# Patient Record
Sex: FEMALE | Race: WHITE | NOT HISPANIC OR LATINO | Employment: OTHER
[De-identification: names, ages, dates, MRNs, and addresses within clinical notes are randomized per-mention and may not be internally consistent; named-entity substitution may affect disease eponyms.]

---

## 2017-10-01 ENCOUNTER — HEALTH MAINTENANCE LETTER (OUTPATIENT)
Age: 52
End: 2017-10-01

## 2020-02-23 ENCOUNTER — HEALTH MAINTENANCE LETTER (OUTPATIENT)
Age: 55
End: 2020-02-23

## 2020-12-13 ENCOUNTER — HEALTH MAINTENANCE LETTER (OUTPATIENT)
Age: 55
End: 2020-12-13

## 2021-04-17 ENCOUNTER — HEALTH MAINTENANCE LETTER (OUTPATIENT)
Age: 56
End: 2021-04-17

## 2021-09-26 ENCOUNTER — HEALTH MAINTENANCE LETTER (OUTPATIENT)
Age: 56
End: 2021-09-26

## 2022-01-01 ENCOUNTER — TRANSFERRED RECORDS (OUTPATIENT)
Dept: MULTI SPECIALTY CLINIC | Facility: CLINIC | Age: 57
End: 2022-01-01

## 2022-01-01 LAB — PAP SMEAR - HIM PATIENT REPORTED: NEGATIVE

## 2022-03-13 ENCOUNTER — HEALTH MAINTENANCE LETTER (OUTPATIENT)
Age: 57
End: 2022-03-13

## 2022-05-08 ENCOUNTER — HEALTH MAINTENANCE LETTER (OUTPATIENT)
Age: 57
End: 2022-05-08

## 2023-01-08 ENCOUNTER — HEALTH MAINTENANCE LETTER (OUTPATIENT)
Age: 58
End: 2023-01-08

## 2023-02-07 ENCOUNTER — OFFICE VISIT (OUTPATIENT)
Dept: FAMILY MEDICINE | Facility: CLINIC | Age: 58
End: 2023-02-07
Payer: COMMERCIAL

## 2023-02-07 VITALS
SYSTOLIC BLOOD PRESSURE: 120 MMHG | HEIGHT: 64 IN | TEMPERATURE: 97.7 F | RESPIRATION RATE: 14 BRPM | DIASTOLIC BLOOD PRESSURE: 82 MMHG | BODY MASS INDEX: 35.68 KG/M2 | WEIGHT: 209 LBS | OXYGEN SATURATION: 98 % | HEART RATE: 67 BPM

## 2023-02-07 DIAGNOSIS — Z76.0 PRESCRIPTION REFILL: ICD-10-CM

## 2023-02-07 DIAGNOSIS — Z12.11 COLON CANCER SCREENING: Primary | ICD-10-CM

## 2023-02-07 DIAGNOSIS — Z12.31 ENCOUNTER FOR SCREENING MAMMOGRAM FOR BREAST CANCER: ICD-10-CM

## 2023-02-07 PROCEDURE — 99203 OFFICE O/P NEW LOW 30 MIN: CPT | Performed by: PHYSICIAN ASSISTANT

## 2023-02-07 RX ORDER — LEVOTHYROXINE SODIUM 100 UG/1
100 TABLET ORAL
COMMUNITY
Start: 2022-09-21 | End: 2023-12-14

## 2023-02-07 RX ORDER — LEVOTHYROXINE SODIUM 100 UG/1
100 TABLET ORAL DAILY
Qty: 90 TABLET | Refills: 0 | Status: CANCELLED | OUTPATIENT
Start: 2023-02-07

## 2023-02-07 ASSESSMENT — PAIN SCALES - GENERAL: PAINLEVEL: MILD PAIN (3)

## 2023-02-07 NOTE — PROGRESS NOTES
Assessment & Plan     (Z12.11) Colon cancer screening  (primary encounter diagnosis)  Comment: Referral placed for colonoscopy screening.  Patient was advised to have this done every 5 years.  Plan: Colonoscopy Screening  Referral           (Z76.0) Prescription refill  Comment: Patient initially presented for prescription refill, however, in chart review while in the exam room I found the patient to have refills were written on 9/21/2022.  Patient called pharmacy and did in fact have refills of levothyroxine 100 mcg at that time.  Plan: Continue with medications as prescribed.  Follow-up in September as needed for annual exam.    (Z12.31) Encounter for screening mammogram for breast cancer  Comment: Discussed screening mammogram with patient.  She will schedule  Plan: MA SCREENING DIGITAL BILAT - Future  (s+30)                Return in about 8 months (around 10/7/2023) for Follow up, Routine preventive.    Eron Miles PA-C  [facility]    Matthew Doan is a 57 year old, presenting for the following health issues:  Medication Request      History of Present Illness       Reason for visit:  Rx refill    She eats 4 or more servings of fruits and vegetables daily.She consumes 1 sweetened beverage(s) daily.She exercises with enough effort to increase her heart rate 9 or less minutes per day.  She exercises with enough effort to increase her heart rate 3 or less days per week. She is missing 1 dose(s) of medications per week.     Patient has been taking levothyroxine 100 mg. No issues with the medication.   TSH 9/21/23 2.15 through [facility]   Years at the same dose.   No constipation, diarrhea, or hair loss.     Patient is looking to establish at this clinic.  Patient thought she was due for refills of levothyroxine, however, patient called pharmacy while in the exam room and found that she has refills of medications at [facility] pharmacy.    Patient does express desire to update her  "colonoscopy.  Last colonoscopy 2013 was recommended to have repeat in 5 years.  Patient has not completed this.    Further, the patient would like to update mammogram.  She has not had any concerns.      Review of Systems   Constitutional, HEENT, cardiovascular, pulmonary, gi and gu systems are negative, except as otherwise noted.      Objective    BP (!) 156/92   Pulse 67   Temp 97.7  F (36.5  C) (Tympanic)   Resp 14   Ht 1.613 m (5' 3.5\")   Wt 94.8 kg (209 lb)   SpO2 98%   BMI 36.44 kg/m    Body mass index is 36.44 kg/m .  Physical Exam   GENERAL: healthy, alert and no distress  RESP: lungs clear to auscultation - no rales, rhonchi or wheezes  CV: regular rate and rhythm, normal S1 S2, no S3 or S4, no murmur, click or rub, no peripheral edema and peripheral pulses strong  MS: no gross musculoskeletal defects noted, no edema  PSYCH: mentation appears normal, affect normal/bright          "

## 2023-04-17 ENCOUNTER — TELEPHONE (OUTPATIENT)
Dept: FAMILY MEDICINE | Facility: CLINIC | Age: 58
End: 2023-04-17
Payer: COMMERCIAL

## 2023-04-17 NOTE — TELEPHONE ENCOUNTER
Patient Quality Outreach    Patient is due for the following:   Colon Cancer Screening  Cervical Cancer Screening - PAP Needed      Topic Date Due     Zoster (Shingles) Vaccine (1 of 2) Never done     Hepatitis B Vaccine (2 of 3 - 19+ 3-dose series) 04/28/2017     COVID-19 Vaccine (3 - Booster for Mariano series) 03/17/2022       Next Steps:   Schedule a lab only visit for Colon Cancer Screening and PAP SMEAR.    Type of outreach:    Sent [employer] message.      Questions for provider review:         Blas Goddard MA

## 2023-07-27 ENCOUNTER — ANCILLARY ORDERS (OUTPATIENT)
Dept: RADIOLOGY | Facility: CLINIC | Age: 58
End: 2023-07-27

## 2023-07-27 ENCOUNTER — ANCILLARY ORDERS (OUTPATIENT)
Dept: FAMILY MEDICINE | Facility: CLINIC | Age: 58
End: 2023-07-27

## 2023-07-27 ENCOUNTER — ANCILLARY PROCEDURE (OUTPATIENT)
Dept: MAMMOGRAPHY | Facility: CLINIC | Age: 58
End: 2023-07-27
Attending: PHYSICIAN ASSISTANT
Payer: COMMERCIAL

## 2023-07-27 DIAGNOSIS — Z12.31 ENCOUNTER FOR SCREENING MAMMOGRAM FOR BREAST CANCER: ICD-10-CM

## 2023-07-27 DIAGNOSIS — Z76.0 PRESCRIPTION REFILL: ICD-10-CM

## 2023-07-27 DIAGNOSIS — Z12.11 COLON CANCER SCREENING: Primary | ICD-10-CM

## 2023-07-27 PROCEDURE — 77067 SCR MAMMO BI INCL CAD: CPT | Mod: TC | Performed by: RADIOLOGY

## 2023-07-27 PROCEDURE — 77063 BREAST TOMOSYNTHESIS BI: CPT | Mod: TC | Performed by: RADIOLOGY

## 2023-09-11 ENCOUNTER — TELEPHONE (OUTPATIENT)
Dept: FAMILY MEDICINE | Facility: CLINIC | Age: 58
End: 2023-09-11
Payer: COMMERCIAL

## 2023-09-11 NOTE — TELEPHONE ENCOUNTER
Patient Quality Outreach    Patient is due for the following:   Colon Cancer Screening  Physical Preventive Adult Physical      Topic Date Due    Zoster (Shingles) Vaccine (1 of 2) Never done    Hepatitis B Vaccine (2 of 3 - 19+ 3-dose series) 04/28/2017    COVID-19 Vaccine (3 - Booster for Mariano series) 03/17/2022    Flu Vaccine (1) 09/01/2023       Next Steps:   Schedule a Adult Preventative    Type of outreach:    Sent [employer] message.      Questions for provider review:               Blas Goddard MA

## 2023-09-28 NOTE — PROGRESS NOTES
SUBJECTIVE:   CC: Olimpia Flor is an 58 year old who presents for preventive health visit.       10/5/2023     1:16 PM   Additional Questions   Roomed by Tatyana Joe MA   Accompanied by Self     Routine general medical examination at a health care facility  Updated   - Lipid panel reflex to direct LDL Fasting; Future    Hypothyroidism, unspecified type  Stable  - TSH WITH FREE T4 REFLEX; Future  - TSH WITH FREE T4 REFLEX  Has been normal long standing, doing well. No fatigue, GI upset, mood changes, etc.   Has refill of synthroid ok to reach out when needed     Pinguecula of both eyes  Benign in nature. Artificial tears if bothersome although not symptomatic right now. If changing recheck    Episodic headache  Headaches, chronic, post menopausal. More active lately. Crying episodes. Intact uterus. Does not want to do HRT, which I agree with at this time. Responded well to excedrin, ok to use when headache comes on. Discussed further options if required down the road.        Colon cancer screening is due per pt, appt scot.    PAP was completed 01/01/2022 @ [facility] women's health result were normal. Will abstract into charts.            Healthy Habits:     Getting at least 3 servings of Calcium per day:  Yes    Bi-annual eye exam:  Yes    Dental care twice a year:  Yes    Sleep apnea or symptoms of sleep apnea:  None    Diet:  Regular (no restrictions)    Frequency of exercise:  2-3 days/week    Duration of exercise:  30-45 minutes    Taking medications regularly:  Yes    Medication side effects:  None    Additional concerns today:  Yes            Have you ever done Advance Care Planning? (For example, a Health Directive, POLST, or a discussion with a medical provider or your loved ones about your wishes): No, advance care planning information given to patient to review.  Patient declined advance care planning discussion at this time.    Social History     Tobacco Use    Smoking status: Never    Smokeless tobacco:  Never   Substance Use Topics    Alcohol use: Not on file             10/5/2023     1:18 PM   Alcohol Use   Prescreen: >3 drinks/day or >7 drinks/week? Not Applicable          No data to display              Reviewed orders with patient.  Reviewed health maintenance and updated orders accordingly - Yes  Lab work is in process  Labs reviewed in EPIC    Breast Cancer Screening:  Any new diagnosis of family breast, ovarian, or bowel cancer? No    FHS-7:       7/27/2023    10:22 AM   Breast CA Risk Assessment (FHS-7)   Did any of your first-degree relatives have breast or ovarian cancer? No   Did any of your relatives have bilateral breast cancer? No   Did any man in your family have breast cancer? No   Did any woman in your family have breast and ovarian cancer? No   Did any woman in your family have breast cancer before age 50 y? No   Do you have 2 or more relatives with breast and/or ovarian cancer? No   Do you have 2 or more relatives with breast and/or bowel cancer? No       Mammogram Screening: Recommended mammography every 1-2 years with patient discussion and risk factor consideration  Pertinent mammograms are reviewed under the imaging tab.    History of abnormal Pap smear: Completed, abstract into charts. No abnormalities      Reviewed and updated as needed this visit by clinical staff   Tobacco  Allergies  Meds  Problems  Med Hx  Surg Hx  Fam Hx          Reviewed and updated as needed this visit by Provider   Tobacco  Allergies  Meds  Problems  Med Hx  Surg Hx  Fam Hx         Past Medical History:   Diagnosis Date    Hypothyroidism       Past Surgical History:   Procedure Laterality Date    TOTAL KNEE ARTHROPLASTY Bilateral      OB History   No obstetric history on file.       Review of Systems   Constitutional:  Negative for chills and fever.   HENT:  Negative for congestion, ear pain, hearing loss and sore throat.    Eyes:  Negative for pain and visual disturbance.   Respiratory:  Negative for  cough and shortness of breath.    Cardiovascular:  Negative for chest pain, palpitations and peripheral edema.   Gastrointestinal:  Negative for abdominal pain, constipation, diarrhea, heartburn, hematochezia and nausea.   Breasts:  Negative for tenderness, breast mass and discharge.   Genitourinary:  Negative for dysuria, frequency, genital sores, hematuria, pelvic pain, urgency, vaginal bleeding and vaginal discharge.   Musculoskeletal:  Positive for arthralgias. Negative for joint swelling and myalgias.   Skin:  Negative for rash.   Neurological:  Positive for headaches. Negative for dizziness, weakness and paresthesias.   Psychiatric/Behavioral:  Positive for mood changes. The patient is nervous/anxious.           OBJECTIVE:   /77   Pulse 68   Temp 98.2  F (36.8  C) (Tympanic)   Resp 16   Wt 90.7 kg (200 lb)   SpO2 98%   Breastfeeding No   BMI 34.87 kg/m      Physical Exam  GENERAL: healthy, alert and no distress  EYES: Eyes grossly normal to inspection, PERRL and conjunctivae and sclerae normal  HENT: ear canals and TM's normal, nose and mouth without ulcers or lesions  NECK: no adenopathy, no asymmetry, masses, or scars and thyroid normal to palpation  RESP: lungs clear to auscultation - no rales, rhonchi or wheezes  CV: regular rate and rhythm, normal S1 S2, no S3 or S4, no murmur, click or rub, no peripheral edema and peripheral pulses strong  ABDOMEN: soft, nontender, no hepatosplenomegaly, no masses and bowel sounds normal  MS: no gross musculoskeletal defects noted, no edema  SKIN: no suspicious lesions or rashes  PSYCH: mentation appears normal, affect normal/bright    Diagnostic Test Results:  Labs reviewed in Epic        Patient has been advised of split billing requirements and indicates understanding: Yes      COUNSELING:  Reviewed preventive health counseling, as reflected in patient instructions       Regular exercise       Healthy diet/nutrition       Alcohol Use      BMI:   Estimated  "body mass index is 34.87 kg/m  as calculated from the following:    Height as of 2/7/23: 1.613 m (5' 3.5\").    Weight as of this encounter: 90.7 kg (200 lb).   Weight management plan: Discussed healthy diet and exercise guidelines      She reports that she has never smoked. She has never used smokeless tobacco.          JACI GUEVARA PA-C  [facility]  "

## 2023-10-05 ENCOUNTER — OFFICE VISIT (OUTPATIENT)
Dept: FAMILY MEDICINE | Facility: CLINIC | Age: 58
End: 2023-10-05
Payer: COMMERCIAL

## 2023-10-05 VITALS
OXYGEN SATURATION: 98 % | SYSTOLIC BLOOD PRESSURE: 119 MMHG | BODY MASS INDEX: 34.87 KG/M2 | DIASTOLIC BLOOD PRESSURE: 77 MMHG | RESPIRATION RATE: 16 BRPM | WEIGHT: 200 LBS | TEMPERATURE: 98.2 F | HEART RATE: 68 BPM

## 2023-10-05 DIAGNOSIS — H11.153 PINGUECULA OF BOTH EYES: ICD-10-CM

## 2023-10-05 DIAGNOSIS — Z00.00 ROUTINE GENERAL MEDICAL EXAMINATION AT A HEALTH CARE FACILITY: Primary | ICD-10-CM

## 2023-10-05 DIAGNOSIS — G44.219 EPISODIC TENSION-TYPE HEADACHE, NOT INTRACTABLE: ICD-10-CM

## 2023-10-05 DIAGNOSIS — E03.9 HYPOTHYROIDISM, UNSPECIFIED TYPE: ICD-10-CM

## 2023-10-05 PROBLEM — Z71.89 ADVANCED DIRECTIVES, COUNSELING/DISCUSSION: Status: ACTIVE | Noted: 2023-10-05

## 2023-10-05 PROCEDURE — 99396 PREV VISIT EST AGE 40-64: CPT | Performed by: PHYSICIAN ASSISTANT

## 2023-10-05 PROCEDURE — 36415 COLL VENOUS BLD VENIPUNCTURE: CPT | Performed by: PHYSICIAN ASSISTANT

## 2023-10-05 PROCEDURE — 84443 ASSAY THYROID STIM HORMONE: CPT | Performed by: PHYSICIAN ASSISTANT

## 2023-10-05 PROCEDURE — 99214 OFFICE O/P EST MOD 30 MIN: CPT | Mod: 25 | Performed by: PHYSICIAN ASSISTANT

## 2023-10-05 ASSESSMENT — ENCOUNTER SYMPTOMS
ABDOMINAL PAIN: 0
BREAST MASS: 0
SHORTNESS OF BREATH: 0
MYALGIAS: 0
COUGH: 0
SORE THROAT: 0
HEMATOCHEZIA: 0
FREQUENCY: 0
NAUSEA: 0
HEADACHES: 1
DIZZINESS: 0
EYE PAIN: 0
DYSURIA: 0
PARESTHESIAS: 0
DIARRHEA: 0
HEMATURIA: 0
FEVER: 0
NERVOUS/ANXIOUS: 1
CONSTIPATION: 0
HEARTBURN: 0
JOINT SWELLING: 0
ARTHRALGIAS: 1
PALPITATIONS: 0
WEAKNESS: 0
CHILLS: 0

## 2023-10-05 ASSESSMENT — PAIN SCALES - GENERAL: PAINLEVEL: NO PAIN (0)

## 2023-10-05 NOTE — Clinical Note
Please abstract the following data from this visit with this patient into the appropriate field in Epic:  Tests that can be patient reported without a hard copy:  Pap smear done on this date: 01/01/2022 (approximately), by this group: [facility], results were normal.   Other Tests found in the patient's chart through Chart Review/Care Everywhere:  {Abstract Quality List (Optional):890200}  Note to Abstraction: If this section is blank, no results were found via Chart Review/Care Everywhere.

## 2023-10-06 LAB — TSH SERPL DL<=0.005 MIU/L-ACNC: 2.31 UIU/ML (ref 0.3–4.2)

## 2023-11-09 ENCOUNTER — TELEPHONE (OUTPATIENT)
Dept: FAMILY MEDICINE | Facility: CLINIC | Age: 58
End: 2023-11-09
Payer: COMMERCIAL

## 2023-11-09 NOTE — TELEPHONE ENCOUNTER
"Patient called in stating that last night at [facility] she experienced a panic attack.  Stated that EMS was called and she was sobbing, had tingling hands and rapid breathing that she couldn't catch.  Stated that she wasn't sure what triggered it but was embarrassed by it.  Did state the paramedic took an ECG and stated that her \"T waves were dropping\".  Paramedic stated this could be due to the current situation but advised the patient to get checked out.  Patient is concerned about this and wondering if there is anything else she should do about it.        She also stated she has been dealing with the lose of her nephew and other normal life stressors.  She does have an appointment with a therapist at the end of the month.  Writer encouraged her to keep this appointment.  Patient stated she would keep this appointment.      Routing to provider to review and advise on abnormal ECG reading during panic attack.      Kristina Kjellberg, MSN, RN  [facility] Primary [facility] Triage    "

## 2023-11-10 NOTE — TELEPHONE ENCOUNTER
Patient notified of provider's message as written below. The patient was warm transferred to central scheduling and they were asked to assist patient in making an appointment. They verified they understood.  Patient verbalized good understanding, had no further questions and needed no further support.Emily Rabago R.N.

## 2023-11-10 NOTE — TELEPHONE ENCOUNTER
Please contact patient.     Should have clinic follow up. If any worsening, new chest pain, or other concerns should be seen emergently.    Eron Miles PA-C

## 2023-12-14 DIAGNOSIS — E03.9 HYPOTHYROIDISM, UNSPECIFIED TYPE: Primary | ICD-10-CM

## 2023-12-14 RX ORDER — LEVOTHYROXINE SODIUM 100 UG/1
100 TABLET ORAL DAILY
Qty: 90 TABLET | Refills: 1 | Status: SHIPPED | OUTPATIENT
Start: 2023-12-14 | End: 2024-06-07

## 2023-12-14 NOTE — TELEPHONE ENCOUNTER
Medication Question or Refill        What medication are you calling about (include dose and sig)?: Pending Prescriptions:                       Disp   Refills    levothyroxine (SYNTHROID/LEVOTHROID) 100 *                    Sig: Take 1 tablet (100 mcg) by mouth       CSA -- Patient Level:    CSA: None found at the patient level.         Who prescribed the medication?: Eron Miles      Do you need a refill? Yes:     Note from pharmacy?  No    Rx and pharmacy pended per refill request.  Iva Garcia, CMA

## 2024-04-22 ENCOUNTER — NURSE TRIAGE (OUTPATIENT)
Dept: FAMILY MEDICINE | Facility: CLINIC | Age: 59
End: 2024-04-22
Payer: COMMERCIAL

## 2024-04-22 NOTE — TELEPHONE ENCOUNTER
"Patient called in stating that she has been having a pain in the left of her sternum that is present intermittently.  She said the area always feels tender but intermittently has moderate pain \"feels like I just got punched in the chest.\"  The pain lasts 10-15 seconds at a time.  Denies any trauma to the area such as a seatbelt stopping her abruptly.  Patient also states she is having trouble getting in a deep breath as well.  Denies any sweating, nausea, vomiting, dizziness.  She states she is still have hot flashes but isn't sweating profusely.  Patient denies her heart is beating very fast or slow but was driving so unable to check rate.        Recommended Disposition: Go to ED now.  Discussed with patient the need to be seen in the Emergency department.  Discussed that it would be better to rule out the worst thing and be safe than sorry.  Discussed difference between urgent care and emergency room.  Patient verbalized understanding.        Reason for Disposition   Difficulty breathing    Additional Information   Negative: Passed out (i.e., lost consciousness, collapsed and was not responding)   Negative: Difficult to awaken or acting confused (e.g., disoriented, slurred speech)   Negative: Shock suspected (e.g., cold/pale/clammy skin, too weak to stand, low BP, rapid pulse)   Negative: SEVERE difficulty breathing (e.g., struggling for each breath, speaks in single words)   Negative: Chest pain lasting longer than 5 minutes and ANY of the following:         Pain is crushing, pressure-like, or heavy         Over 44 years old          Over 30 years old and one cardiac risk factor (e.g diabetes, high blood pressure, high cholesterol, smoker, or family history of heart disease)         History of heart disease (e.g. angina, heart attack, heart failure, bypass surgery, takes nitroglycerin)   Negative: Visible sweat on face or sweat dripping down face   Negative: Sounds like a life-threatening emergency to the " "triager   Negative: Heart beating < 50 beats per minute OR > 140 beats per minute   Negative: Followed an injury to chest   Negative: SEVERE chest pain   Negative: Pain also in shoulder(s) or arm(s) or jaw    Answer Assessment - Initial Assessment Questions  1. LOCATION: \"Where does it hurt?\"        Just to the left of the sternum  2. RADIATION: \"Does the pain go anywhere else?\" (e.g., into neck, jaw, arms, back)      Into left breast  3. ONSET: \"When did the chest pain begin?\" (Minutes, hours or days)       Yesterday  4. PATTERN: \"Does the pain come and go, or has it been constant since it started?\"  \"Does it get worse with exertion?\"       Comes and goes, Not worse with exertion  5. DURATION: \"How long does it last\" (e.g., seconds, minutes, hours)      10-15 seconds  6. SEVERITY: \"How bad is the pain?\"  (e.g., Scale 1-10; mild, moderate, or severe)     - MILD (1-3): doesn't interfere with normal activities      - MODERATE (4-7): interferes with normal activities or awakens from sleep     - SEVERE (8-10): excruciating pain, unable to do any normal activities        Moderate pain  7. CARDIAC RISK FACTORS: \"Do you have any history of heart problems or risk factors for heart disease?\" (e.g., angina, prior heart attack; diabetes, high blood pressure, high cholesterol, smoker, or strong family history of heart disease)      None  8. PULMONARY RISK FACTORS: \"Do you have any history of lung disease?\"  (e.g., blood clots in lung, asthma, emphysema, birth control pills)      None  9. CAUSE: \"What do you think is causing the chest pain?\"      Unsure what might be causing it  10. OTHER SYMPTOMS: \"Do you have any other symptoms?\" (e.g., dizziness, nausea, vomiting, sweating, fever, difficulty breathing, cough)        Cannot get a deep breath, No other   11. PREGNANCY: \"Is there any chance you are pregnant?\" \"When was your last menstrual period?\"        No    Protocols used: Chest Pain-A-[place]    "

## 2024-04-24 ENCOUNTER — OFFICE VISIT (OUTPATIENT)
Dept: FAMILY MEDICINE | Facility: CLINIC | Age: 59
End: 2024-04-24
Payer: COMMERCIAL

## 2024-04-24 VITALS
RESPIRATION RATE: 16 BRPM | TEMPERATURE: 97.9 F | SYSTOLIC BLOOD PRESSURE: 134 MMHG | DIASTOLIC BLOOD PRESSURE: 89 MMHG | OXYGEN SATURATION: 97 % | HEART RATE: 71 BPM

## 2024-04-24 DIAGNOSIS — M94.0 COSTOCHONDRITIS: Primary | ICD-10-CM

## 2024-04-24 DIAGNOSIS — F41.9 ANXIETY: ICD-10-CM

## 2024-04-24 PROCEDURE — 99215 OFFICE O/P EST HI 40 MIN: CPT | Performed by: PHYSICIAN ASSISTANT

## 2024-04-24 ASSESSMENT — PAIN SCALES - GENERAL: PAINLEVEL: NO PAIN (0)

## 2024-04-24 NOTE — PATIENT INSTRUCTIONS
Referral for Behavioral Health Clinician (BHC)    During our visit, I encouraged you talk with our Behavioral Health Clinician (BHC). A BHC would be a great addition to your care team and will support your whole health!    What is a Behavioral Health Clinician (BHC)?    A BHC is a licensed mental health therapist. They can help you when behaviors, emotions, stress or worries get in the way of your life or health. Your BHC will work with you and your primary care team. Together, you'll come up with a unique care plan that works for you!         [facility] MÓNICA [place], [place]    What will happen when I meet with a BHC?    BHCs ask questions to better understand your concerns. They will provide brief, solution-focused therapy. They can also make referrals to a specialty therapist or other services to help you reach your goals.      How do I schedule an appointment with the [facility]?    Call [phone] and ask for a C appointment. You can schedule a virtual or in-person session.    How much time will I spend with the [facility]?     First visits are 45-60 minutes.  Return visits are typically 20-30 minutes.         How does billing work?      Outpatient mental health services are billed to insurance. Most plans don't charge a second co-pay if you see your primary care provider (PCP) the same day. Please verify your coverage and ask about your copay.         After the Appointment    As with any provider appointment, you will be given a survey in the mail to let us know how we are doing. This allows us to not only improve services where needed, but also to reinforce where services are going well.

## 2024-04-24 NOTE — PROGRESS NOTES
Assessment & Plan     Costochondritis  Discussion of muscular pain / chostochondritis and treatments. She already has been using heat and feels that is helping. She can use NSAIDS also. Avoid heavy lifting or exacerbating activities. Reviewed all imaging and results from the ER visit also showing no concerning findings.     Anxiety  After discussion of the above, she had concerns about anxiety. She has had a few panic attacks over the past year. She is having difficulty with relationships in her family with her  and her daughter. Feels guilty about certain situations and unhappy with her marriage. I have sent her information to [facility] to reach out to start counseling. She also can plan to follow up with her primary provider if there is an indication for medication, but anxiety is situational and counseling is felt the best place to start at this point.       45 minutes spent by me on the date of the encounter doing chart review, review of outside records, review of test results, interpretation of tests, patient visit, and documentation     MED REC REQUIRED  Post Medication Reconciliation Status: discharge medications reconciled, continue medications without change        Subjective   Olimpia Flor is a 58 year old, presenting for the following health issues:  Hospital F/U    [facility]     Olimpia Flor is here today after an ER visit for chest pain. She had a cardiac work up that was normal.   She had developed the pain a few days prior to the visit. The pain was sharp and stabbing along the left side. The pain would come and go. She was not able to identify any particular triggers. After the ER visit, she has noticed that the pain is dull and feels more like a bruise. The intensity is better. She has been paying attention to triggers and has not noticed that exertion exacerbates or eating / drinking. She continues to check that area and thinks there may be a little swelling over the ribs on the left. No breast changes. No  difficulty breathing.   She has a stress test scheduled at [facility].   She has not noticed a rash or skin changes.     ED/UC Followup:    Facility:  [facility] ER  Date of visit: 04/22/24  Reason for visit: chest pain  Current Status: tenderness        Review of Systems  Constitutional, HEENT, cardiovascular, pulmonary, GI, , musculoskeletal, neuro, skin, endocrine and psych systems are negative, except as otherwise noted.      Objective    /89   Pulse 71   Temp 97.9  F (36.6  C) (Tympanic)   Resp 16   LMP  (LMP Unknown)   SpO2 97%   Breastfeeding No   There is no height or weight on file to calculate BMI.  Physical Exam   GENERAL: alert and no distress  RESP: lungs clear to auscultation - no rales, rhonchi or wheezes  CV: regular rate and rhythm, normal S1 S2, no S3 or S4, no murmur, click or rub, no peripheral edema   CHEST WALL: there is tenderness over the ribs on the left, anterior under breast, I am unable to appreciate any swelling.   ABDOMEN: soft, nontender, no hepatosplenomegaly, no masses and bowel sounds normal  MS: no gross musculoskeletal defects noted, no edema  SKIN: no suspicious lesions or rashes  BACK: no CVA tenderness, no paralumbar tenderness  PSYCH: mentation appears normal, affect normal/bright    Results from ER reviewed.         Signed Electronically by: Kristen M. Kehr, PA-C

## 2024-06-07 ENCOUNTER — MYC REFILL (OUTPATIENT)
Dept: FAMILY MEDICINE | Facility: CLINIC | Age: 59
End: 2024-06-07
Payer: COMMERCIAL

## 2024-06-07 DIAGNOSIS — E03.9 HYPOTHYROIDISM, UNSPECIFIED TYPE: ICD-10-CM

## 2024-06-07 RX ORDER — LEVOTHYROXINE SODIUM 100 UG/1
100 TABLET ORAL DAILY
Qty: 90 TABLET | Refills: 1 | Status: SHIPPED | OUTPATIENT
Start: 2024-06-07

## 2024-06-17 PROBLEM — Z71.89 ADVANCED DIRECTIVES, COUNSELING/DISCUSSION: Status: RESOLVED | Noted: 2023-10-05 | Resolved: 2024-06-17

## 2024-09-05 ENCOUNTER — PATIENT OUTREACH (OUTPATIENT)
Dept: CARE COORDINATION | Facility: CLINIC | Age: 59
End: 2024-09-05
Payer: COMMERCIAL

## 2024-09-19 ENCOUNTER — PATIENT OUTREACH (OUTPATIENT)
Dept: CARE COORDINATION | Facility: CLINIC | Age: 59
End: 2024-09-19
Payer: COMMERCIAL

## 2024-11-17 ENCOUNTER — HEALTH MAINTENANCE LETTER (OUTPATIENT)
Age: 59
End: 2024-11-17

## 2025-02-20 DIAGNOSIS — E03.9 HYPOTHYROIDISM, UNSPECIFIED TYPE: ICD-10-CM

## 2025-02-20 RX ORDER — LEVOTHYROXINE SODIUM 100 UG/1
100 TABLET ORAL DAILY
Qty: 90 TABLET | Refills: 0 | OUTPATIENT
Start: 2025-02-20

## 2025-02-20 NOTE — TELEPHONE ENCOUNTER
levothyroxine (SYNTHROID/LEVOTHROID) 100 MCG     Left message for patient to call back to set up an appointment.Tracey Tomas [facility]

## 2025-02-24 ENCOUNTER — TELEPHONE (OUTPATIENT)
Dept: FAMILY MEDICINE | Facility: CLINIC | Age: 60
End: 2025-02-24
Payer: COMMERCIAL

## 2025-02-24 NOTE — TELEPHONE ENCOUNTER
Called and spoke to patient, changed appointment to in-person.Tracey KIRBY [facility]

## 2025-02-24 NOTE — TELEPHONE ENCOUNTER
Eron Miles PA-C  [facility]  Please contact patient.  They had scheduled online visit.  Needs to be seen in person.  She needs labs and discussions of medications.    Eron Miles PA-C

## 2025-02-24 NOTE — TELEPHONE ENCOUNTER
Patient is scheduled tomorrow with Bautista Miles.Tracey Tomas [facility]

## 2025-02-25 ENCOUNTER — OFFICE VISIT (OUTPATIENT)
Dept: FAMILY MEDICINE | Facility: CLINIC | Age: 60
End: 2025-02-25
Payer: COMMERCIAL

## 2025-02-25 VITALS
TEMPERATURE: 98.4 F | DIASTOLIC BLOOD PRESSURE: 84 MMHG | WEIGHT: 195.6 LBS | OXYGEN SATURATION: 98 % | BODY MASS INDEX: 34.66 KG/M2 | SYSTOLIC BLOOD PRESSURE: 130 MMHG | RESPIRATION RATE: 16 BRPM | HEART RATE: 60 BPM | HEIGHT: 63 IN

## 2025-02-25 DIAGNOSIS — R73.03 PREDIABETES: ICD-10-CM

## 2025-02-25 DIAGNOSIS — R73.09 ELEVATED GLUCOSE: ICD-10-CM

## 2025-02-25 DIAGNOSIS — E03.9 HYPOTHYROIDISM, UNSPECIFIED TYPE: Primary | ICD-10-CM

## 2025-02-25 DIAGNOSIS — Z13.220 LIPID SCREENING: ICD-10-CM

## 2025-02-25 LAB
EST. AVERAGE GLUCOSE BLD GHB EST-MCNC: 123 MG/DL
HBA1C MFR BLD: 5.9 % (ref 0–5.6)

## 2025-02-25 PROCEDURE — 80053 COMPREHEN METABOLIC PANEL: CPT | Performed by: PHYSICIAN ASSISTANT

## 2025-02-25 PROCEDURE — 3079F DIAST BP 80-89 MM HG: CPT | Performed by: PHYSICIAN ASSISTANT

## 2025-02-25 PROCEDURE — 3075F SYST BP GE 130 - 139MM HG: CPT | Performed by: PHYSICIAN ASSISTANT

## 2025-02-25 PROCEDURE — 80061 LIPID PANEL: CPT | Performed by: PHYSICIAN ASSISTANT

## 2025-02-25 PROCEDURE — 99214 OFFICE O/P EST MOD 30 MIN: CPT | Performed by: PHYSICIAN ASSISTANT

## 2025-02-25 PROCEDURE — 36415 COLL VENOUS BLD VENIPUNCTURE: CPT | Performed by: PHYSICIAN ASSISTANT

## 2025-02-25 PROCEDURE — 83036 HEMOGLOBIN GLYCOSYLATED A1C: CPT | Performed by: PHYSICIAN ASSISTANT

## 2025-02-25 PROCEDURE — G2211 COMPLEX E/M VISIT ADD ON: HCPCS | Performed by: PHYSICIAN ASSISTANT

## 2025-02-25 PROCEDURE — 1126F AMNT PAIN NOTED NONE PRSNT: CPT | Performed by: PHYSICIAN ASSISTANT

## 2025-02-25 PROCEDURE — 84443 ASSAY THYROID STIM HORMONE: CPT | Performed by: PHYSICIAN ASSISTANT

## 2025-02-25 RX ORDER — LEVOTHYROXINE SODIUM 100 UG/1
100 TABLET ORAL DAILY
Qty: 90 TABLET | Refills: 1 | Status: SHIPPED | OUTPATIENT
Start: 2025-02-25

## 2025-02-25 ASSESSMENT — PAIN SCALES - GENERAL: PAINLEVEL_OUTOF10: NO PAIN (0)

## 2025-02-25 NOTE — PROGRESS NOTES
"  Assessment & Plan     (E03.9) Hypothyroidism, unspecified type  (primary encounter diagnosis)  Comment: Hypothyroidism.  Discussed recheck TSH.  Refill levothyroxine for the next 6 months pending results of TSH.  If patient develops any hypo or hyperthyroid symptoms should be seen again.    Plan: TSH WITH FREE T4 REFLEX, levothyroxine         (SYNTHROID/LEVOTHROID) 100 MCG tablet          (R73.09) Elevated glucose  Comment: Previously elevated glucose.  Discussed with patient hemoglobin A1c for further evaluation.  Pending results for further medication  Plan: Hemoglobin A1c, Comprehensive metabolic panel         (BMP + Alb, Alk Phos, ALT, AST, Total. Bili,         TP)          (Z13.220) Lipid screening  Comment: Patient requesting lipid screening.  Nonfasting today  Plan: Lipid panel reflex to direct LDL Non-fasting            BMI  Estimated body mass index is 34.65 kg/m  as calculated from the following:    Height as of this encounter: 1.6 m (5' 3\").    Weight as of this encounter: 88.7 kg (195 lb 9.6 oz).   Weight management plan: Discussed healthy diet and exercise guidelines    Subjective   Olimpia Flor is a 59 year old, presenting for the following health issues:  Follow Up, Recheck Medication, and Forms      2/25/2025     4:12 PM   Additional Questions   Roomed by SHAZIA POLLARD   Accompanied by SELF     History of Present Illness       Reason for visit:  RX Refill and Medical document   She is taking medications regularly.     Patient presents for medication recheck.  On levothyroxine and has been on medications for multiple years.  The last TSH in goal October 2023.  Patient denies any hyper or hypothyroid symptoms.  Has had issues with gaining weight, however, patient associates this with limited mobility secondary to joint pain issues.      Review of Systems  Constitutional, HEENT, cardiovascular, pulmonary, gi and gu systems are negative, except as otherwise noted.      Objective    /84   Pulse 60  " " Temp 98.4  F (36.9  C) (Tympanic)   Resp 16   Ht 1.6 m (5' 3\")   Wt 88.7 kg (195 lb 9.6 oz)   LMP  (LMP Unknown)   SpO2 98%   BMI 34.65 kg/m    Body mass index is 34.65 kg/m .  Physical Exam   GENERAL: alert and no distress  NECK: no adenopathy, no asymmetry, masses, or scars  RESP: lungs clear to auscultation - no rales, rhonchi or wheezes  CV: regular rates and rhythm, no murmur, click or rub, peripheral pulses strong, and no peripheral edema  MS: no gross musculoskeletal defects noted, no edema    No results found for any visits on 02/25/25.        Signed Electronically by: Eron Miles PA-C    "

## 2025-02-26 LAB
ALBUMIN SERPL BCG-MCNC: 4.8 G/DL (ref 3.5–5.2)
ALP SERPL-CCNC: 79 U/L (ref 40–150)
ALT SERPL W P-5'-P-CCNC: 16 U/L (ref 0–50)
ANION GAP SERPL CALCULATED.3IONS-SCNC: 13 MMOL/L (ref 7–15)
AST SERPL W P-5'-P-CCNC: 24 U/L (ref 0–45)
BILIRUB SERPL-MCNC: 0.2 MG/DL
BUN SERPL-MCNC: 12.9 MG/DL (ref 8–23)
CALCIUM SERPL-MCNC: 10.1 MG/DL (ref 8.8–10.4)
CHLORIDE SERPL-SCNC: 100 MMOL/L (ref 98–107)
CHOLEST SERPL-MCNC: 202 MG/DL
CREAT SERPL-MCNC: 0.63 MG/DL (ref 0.51–0.95)
EGFRCR SERPLBLD CKD-EPI 2021: >90 ML/MIN/1.73M2
FASTING STATUS PATIENT QL REPORTED: NO
FASTING STATUS PATIENT QL REPORTED: NO
GLUCOSE SERPL-MCNC: 104 MG/DL (ref 70–99)
HCO3 SERPL-SCNC: 26 MMOL/L (ref 22–29)
HDLC SERPL-MCNC: 48 MG/DL
LDLC SERPL CALC-MCNC: 109 MG/DL
NONHDLC SERPL-MCNC: 154 MG/DL
POTASSIUM SERPL-SCNC: 4.6 MMOL/L (ref 3.4–5.3)
PROT SERPL-MCNC: 7.9 G/DL (ref 6.4–8.3)
SODIUM SERPL-SCNC: 139 MMOL/L (ref 135–145)
TRIGL SERPL-MCNC: 226 MG/DL
TSH SERPL DL<=0.005 MIU/L-ACNC: 2.22 UIU/ML (ref 0.3–4.2)

## 2025-04-03 ENCOUNTER — PATIENT OUTREACH (OUTPATIENT)
Dept: CARE COORDINATION | Facility: CLINIC | Age: 60
End: 2025-04-03
Payer: COMMERCIAL

## 2025-07-14 ENCOUNTER — PATIENT OUTREACH (OUTPATIENT)
Dept: CARE COORDINATION | Facility: CLINIC | Age: 60
End: 2025-07-14
Payer: COMMERCIAL

## 2025-08-18 ENCOUNTER — TELEPHONE (OUTPATIENT)
Dept: FAMILY MEDICINE | Facility: CLINIC | Age: 60
End: 2025-08-18
Payer: COMMERCIAL